# Patient Record
Sex: FEMALE | Race: BLACK OR AFRICAN AMERICAN | NOT HISPANIC OR LATINO | Employment: UNEMPLOYED | URBAN - METROPOLITAN AREA
[De-identification: names, ages, dates, MRNs, and addresses within clinical notes are randomized per-mention and may not be internally consistent; named-entity substitution may affect disease eponyms.]

---

## 2022-08-29 PROBLEM — R22.0 FACIAL SWELLING: Status: ACTIVE | Noted: 2022-08-29

## 2023-06-09 PROBLEM — H55.00 NYSTAGMUS: Status: ACTIVE | Noted: 2023-06-09

## 2023-06-09 PROBLEM — E87.6 HYPOKALEMIA: Status: ACTIVE | Noted: 2023-06-09

## 2023-06-09 PROBLEM — R42 DIZZINESS: Status: ACTIVE | Noted: 2023-06-09

## 2023-06-09 PROBLEM — Z78.9 ALCOHOL USE: Status: ACTIVE | Noted: 2023-06-09

## 2023-06-09 PROBLEM — I10 HYPERTENSION: Status: ACTIVE | Noted: 2023-06-09

## 2023-06-09 PROBLEM — E11.9 DIABETES MELLITUS TYPE II, CONTROLLED: Status: ACTIVE | Noted: 2023-06-09

## 2023-06-10 PROBLEM — H81.21 VESTIBULAR NEURITIS, RIGHT: Status: ACTIVE | Noted: 2023-06-09

## 2023-06-11 PROBLEM — E53.8 B12 DEFICIENCY: Status: ACTIVE | Noted: 2023-06-11

## 2023-06-14 ENCOUNTER — PATIENT OUTREACH (OUTPATIENT)
Dept: ADMINISTRATIVE | Facility: CLINIC | Age: 48
End: 2023-06-14

## 2023-06-14 PROBLEM — R11.2 NAUSEA AND VOMITING: Status: ACTIVE | Noted: 2023-06-14

## 2023-06-14 PROBLEM — G45.9 TIA (TRANSIENT ISCHEMIC ATTACK): Status: ACTIVE | Noted: 2023-06-14

## 2023-06-14 NOTE — PROGRESS NOTES
C3 nurse attempted to contact Mati Mays  for a [facility] post hospital discharge follow up call. No answer. The patient does not have a scheduled HOSFU appointment, and the pt does not have an [facility] PCP.

## 2023-09-18 PROBLEM — G45.9 TIA (TRANSIENT ISCHEMIC ATTACK): Status: RESOLVED | Noted: 2023-06-14 | Resolved: 2023-09-18

## 2024-02-08 PROBLEM — K05.6 PERIODONTAL DISEASE: Status: ACTIVE | Noted: 2024-02-08
